# Patient Record
Sex: FEMALE | Race: WHITE | NOT HISPANIC OR LATINO | Employment: UNEMPLOYED | URBAN - METROPOLITAN AREA
[De-identification: names, ages, dates, MRNs, and addresses within clinical notes are randomized per-mention and may not be internally consistent; named-entity substitution may affect disease eponyms.]

---

## 2018-06-13 ENCOUNTER — CONVERSION ENCOUNTER (OUTPATIENT)
Dept: FAMILY MEDICINE CLINIC | Facility: CLINIC | Age: 28
End: 2018-06-13

## 2018-06-13 ENCOUNTER — OFFICE VISIT CONVERTED (OUTPATIENT)
Dept: FAMILY MEDICINE CLINIC | Facility: CLINIC | Age: 28
End: 2018-06-13
Attending: NURSE PRACTITIONER

## 2018-09-27 ENCOUNTER — CONVERSION ENCOUNTER (OUTPATIENT)
Dept: FAMILY MEDICINE CLINIC | Facility: CLINIC | Age: 28
End: 2018-09-27

## 2018-09-27 ENCOUNTER — OFFICE VISIT CONVERTED (OUTPATIENT)
Dept: FAMILY MEDICINE CLINIC | Facility: CLINIC | Age: 28
End: 2018-09-27
Attending: NURSE PRACTITIONER

## 2018-11-28 ENCOUNTER — OFFICE VISIT CONVERTED (OUTPATIENT)
Dept: FAMILY MEDICINE CLINIC | Facility: CLINIC | Age: 28
End: 2018-11-28
Attending: NURSE PRACTITIONER

## 2019-01-15 ENCOUNTER — HOSPITAL ENCOUNTER (OUTPATIENT)
Dept: LAB | Facility: HOSPITAL | Age: 29
Discharge: HOME | End: 2019-01-15
Attending: NURSE PRACTITIONER

## 2019-01-15 LAB
BASOPHILS # BLD AUTO: 0.05 10*3/UL (ref 0–0.2)
BASOPHILS NFR BLD AUTO: 1.15 % (ref 0–3)
EOSINOPHIL # BLD AUTO: 0.08 10*3/UL (ref 0–0.7)
EOSINOPHIL # BLD AUTO: 1.95 % (ref 0–7)
ERYTHROCYTE [DISTWIDTH] IN BLOOD BY AUTOMATED COUNT: 11.3 % (ref 11.5–14.5)
HBA1C MFR BLD: 13.1 G/DL (ref 12–16)
HCT VFR BLD AUTO: 37.4 % (ref 37–47)
LYMPHOCYTES # BLD AUTO: 1.44 10*3/UL (ref 1–5)
MCH RBC QN AUTO: 31.2 PG (ref 27–31)
MCHC RBC AUTO-ENTMCNC: 34.9 G/DL (ref 33–37)
MCV RBC AUTO: 89.5 FL (ref 81–99)
MONOCYTES # BLD AUTO: 0.39 10*3/UL (ref 0.2–1.2)
MONOCYTES NFR BLD AUTO: 9.11 % (ref 3–10)
NEUTROPHILS # BLD AUTO: 2.32 10*3/UL (ref 2–8)
NEUTROPHILS NFR BLD AUTO: 54.2 % (ref 30–85)
NRBC BLD AUTO-RTO: 0 % (ref 0–0.01)
PLATELET # BLD AUTO: 279 10*3/UL (ref 130–400)
PMV BLD AUTO: 6.6 FL (ref 7.4–10.4)
RBC # BLD AUTO: 4.18 10*6/UL (ref 4.2–5.4)
VARIANT LYMPHS NFR BLD MANUAL: 33.6 % (ref 20–45)
WBC # BLD AUTO: 4.28 10*3/UL (ref 4.8–10.8)

## 2019-01-17 ENCOUNTER — OFFICE VISIT CONVERTED (OUTPATIENT)
Dept: FAMILY MEDICINE CLINIC | Facility: CLINIC | Age: 29
End: 2019-01-17
Attending: NURSE PRACTITIONER

## 2019-01-17 ENCOUNTER — CONVERSION ENCOUNTER (OUTPATIENT)
Dept: FAMILY MEDICINE CLINIC | Facility: CLINIC | Age: 29
End: 2019-01-17

## 2019-01-23 ENCOUNTER — HOSPITAL ENCOUNTER (OUTPATIENT)
Dept: CARDIOLOGY | Facility: HOSPITAL | Age: 29
Discharge: HOME | End: 2019-01-23
Attending: NURSE PRACTITIONER

## 2019-03-19 ENCOUNTER — OFFICE VISIT CONVERTED (OUTPATIENT)
Dept: FAMILY MEDICINE CLINIC | Facility: CLINIC | Age: 29
End: 2019-03-19
Attending: NURSE PRACTITIONER

## 2019-09-26 ENCOUNTER — OFFICE VISIT CONVERTED (OUTPATIENT)
Dept: FAMILY MEDICINE CLINIC | Facility: CLINIC | Age: 29
End: 2019-09-26
Attending: NURSE PRACTITIONER

## 2019-09-26 ENCOUNTER — CONVERSION ENCOUNTER (OUTPATIENT)
Dept: FAMILY MEDICINE CLINIC | Facility: CLINIC | Age: 29
End: 2019-09-26

## 2020-05-06 ENCOUNTER — TELEMEDICINE CONVERTED (OUTPATIENT)
Dept: FAMILY MEDICINE CLINIC | Facility: CLINIC | Age: 30
End: 2020-05-06
Attending: NURSE PRACTITIONER

## 2020-11-09 ENCOUNTER — TELEMEDICINE CONVERTED (OUTPATIENT)
Dept: FAMILY MEDICINE CLINIC | Facility: CLINIC | Age: 30
End: 2020-11-09
Attending: NURSE PRACTITIONER

## 2021-05-10 ENCOUNTER — HOSPITAL ENCOUNTER (OUTPATIENT)
Dept: GENERAL RADIOLOGY | Facility: HOSPITAL | Age: 31
Discharge: HOME | End: 2021-05-10
Attending: NURSE PRACTITIONER

## 2021-05-10 ENCOUNTER — OFFICE VISIT CONVERTED (OUTPATIENT)
Dept: FAMILY MEDICINE CLINIC | Facility: CLINIC | Age: 31
End: 2021-05-10
Attending: NURSE PRACTITIONER

## 2021-05-13 NOTE — PROGRESS NOTES
Progress Note      Patient Name: Miriam Renteria   Patient ID: [id number]   Sex: Female   YOB: 1990    Primary Care Provider: Janett SHELDON   Referring Provider: Janett SHELDON    Visit Date: May 6, 2020    Provider: PITO Fernandes   Location: [facility]   Location Address: [address] [place] [address], [address]  [place]  [id number]   Location Phone: [phone]          Chief Complaint  · follow up depression medication      History Of Present Illness  Video Conferencing Visit  Miriam Renteria is a 29 year old /White female who is presenting for evaluation via video conferencing. Verbal consent obtained before beginning visit.   The following staff were present during this visit: Janett SHELDON and Sintia De Souza MA   Miriam Renteria is a 29 year old /White female who presents for evaluation and treatment of:      Pt consented to telehealth via ZOOM. She is following up on her depression medications, she is doing well and needs a refill on the Prozac.  Pt is doing well, no issues or problems at this time.    She had a baby last year, [facility] in [place].  She did not have a PAP she will schedule with our office.           Past Medical History  Disease Name Date Onset Notes   Allergic rhinitis --  --    Anxiety --  --    Hypothyroidism --  Post-partum    Iron deficiency anemia --  --    Pregnancy --  --    Urinary Incontinence --  --          Past Surgical History  Procedure Name Date Notes   Dilation and Curettage 2008 & 2013 --          Medication List  Name Date Started Instructions   Prozac 10 mg oral capsule 05/06/2020 take 2 capsules (20 mg) by oral route once daily for 90 days   Zyrtec 10 mg oral tablet 06/13/2018 take 1 tablet (10 mg) by oral route once daily         Allergy List  Allergen Name Date Reaction Notes   amoxicillin --  --  --          Family Medical History  Disease Name Relative/Age Notes   Cervical Neoplasm, Malignant Mother/28    --    Brain Neoplasm, Benign Uncle/   --    Heart Disease Grandmother (maternal)/   --    Hypertension Mother/   --    Lung cancer Uncle/   --    Skin Carcinoma Grandfather (maternal)/  Grandmother (maternal)/   --          Reproductive History  Menstrual   Age Menarche: 10   Pregnancy Summary   Total Pregnancies: 5 Full Term: 3 Premature: 0   Ab Induced: 0 Ab Spontaneous: 2 Ectopics: 0   Multiples: 0 Living: 3         Social History  Finding Status Start/Stop Quantity Notes   Alcohol Never --/-- --  --     --  --/-- --  --    Tobacco Never --/-- --  --          Immunizations  NameDate Admin Mfg Trade Name Lot Number Route Inj VIS Given VIS Publication   Hepatitis A01/17/2019 MSD VAQTA-ADULT [id number] IM RD 01/17/2019 07/20/2016   Comments: Injection given. Pt tolerated well.   Hepatitis A06/13/2018 SKB HAVRIX-ADULT  IM LD 06/13/2018 07/20/2016   Comments: Injection given. pt tolerated well.   Svswdvexz24/01/2018 SKB Fluzone Quadrivalent  NE NE 11/28/2018    Comments:          Review of Systems  · Constitutional  o Denies  o : fever, fatigue, weight loss, weight gain  · Cardiovascular  o Denies  o : lower extremity edema, claudication, chest pressure, palpitations  · Respiratory  o Denies  o : shortness of breath, wheezing, cough, hemoptysis, dyspnea on exertion  · Gastrointestinal  o Denies  o : nausea, vomiting, diarrhea, constipation, abdominal pain  · Psychiatric  o Admits  o : anxiety, depression      Physical Examination  · Constitutional  o Appearance  o : well-nourished, well developed, alert, in no acute distress  · Psychiatric  o Mood and Affect  o : mood normal, affect appropriate, denies any SI/HI              Assessment  · Depression     311/F32.9  · Hypothyroidism     244.9/E03.9  · Iron deficiency anemia     280.9/D50.9  · Anxiety     300.02/F41.1  · Allergic rhinitis     477.9/J30.9    Problems Reconciled  Plan  · Orders  o CBC with Auto Diff [facility] (72086) - - 05/06/2020  o CMP [facility] (74056) -  "- 05/06/2020  o Lipid Panel [facility] (34946) - - 05/06/2020  o Thyroid Profile (37981, 69198, THYII) - - 05/06/2020  o ACO-39: Current medications updated and reviewed () - - 05/06/2020  o Iron Profile (Iron 58442 TIBC 67046 and Transferrin 41840) (IRONP) - - 05/06/2020  · Medications  o Prozac 10 mg oral capsule   SIG: take 2 capsules (20 mg) by oral route once daily for 90 days   DISP: (180) capsules with 1 refills  Refilled on 05/06/2020     · Instructions  o Patient was given an SSRI/SSNRI medication and warned of possible side effects of the medication including potential for increased risk of suicidal thoughts and feelings. Patient was instructed that if they begin to exhibit any of these effects they will discontinue the medication immediately and contact our office or the ER ASAP.  o Patient agrees to a \"No Self Harm\" contract. Patient will either call us, 1, ER, [facility], [facility].  o Patient instructed to seek medical attention urgently for new or worsening symptoms.  o Call the office with any concerns or questions.  o she will schedule a pap with our office  · Disposition  o Return Visit Request in/on 6 months +/- 2 weeks (11074).            Electronically Signed by: PITO Fernandes -Author on May 6, 2020 08:26:05 AM  "

## 2021-05-13 NOTE — PROGRESS NOTES
Progress Note      Patient Name: Miriam Renteria   Patient ID: [id number]   Sex: Female   YOB: 1990    Primary Care Provider: Janett SHELDON   Referring Provider: Janett SHELDON    Visit Date: November 9, 2020    Provider: PITO Fernandes   [place] [facility]   Location Address: [address] [place] [address], [address]  [place], [place]  [id number]   Location Phone: [phone]          Chief Complaint  · 6 mo f/u      History Of Present Illness  Video Conferencing Visit  Miriam Renteria is a 30 year old /White female who is presenting for evaluation via video conferencing via google duo. Verbal consent obtained before beginning visit.   The following staff were present during this visit: PITO Fernandes and DIRK Bill   Miriam Renteria is a 30 year old /White female who presents for evaluation and treatment of:      Pt is a 6 mo f/u for depression. Pt states things are good. No issues to report at this time.     She would like a refill on Prozac however since category C pt to get an OK from OBGYN first.    Pt is pregnant and is seen by Dr. Daniel at [facility].       Past Medical History  Disease Name Date Onset Notes   Allergic rhinitis --  --    Anxiety --  --    Hypothyroidism --  Post-partum    Iron deficiency anemia --  --    Pregnancy --  --    Urinary incontinence --  --          Past Surgical History  Procedure Name Date Notes   Dilation and Curettage 2008 & 2013 --          Medication List  Name Date Started Instructions   Prozac 10 mg oral capsule 11/09/2020 take 2 capsules (20 mg) by oral route once daily for 90 days   Zyrtec 10 mg oral tablet 06/13/2018 take 1 tablet (10 mg) by oral route once daily         Allergy List  Allergen Name Date Reaction Notes   amoxicillin --  --  --          Family Medical History  Disease Name Relative/Age Notes   Cervical Neoplasm, Malignant Mother/28   --    Brain Neoplasm, Benign Uncle/   --     Heart Disease Grandmother (maternal)/   --    Hypertension Mother/   --    Lung cancer Uncle/   --    Skin Carcinoma Grandfather (maternal)/  Grandmother (maternal)/   --          Reproductive History  Menstrual   Age Menarche: 10   Pregnancy Summary   Total Pregnancies: 5 Full Term: 3 Premature: 0   Ab Induced: 0 Ab Spontaneous: 2 Ectopics: 0   Multiples: 0 Living: 3         Social History  Finding Status Start/Stop Quantity Notes   Alcohol Never --/-- --  --     --  --/-- --  --    Tobacco Never --/-- --  --          Immunizations  NameDate Admin Mfg Trade Name Lot Number Route Inj VIS Given VIS Publication   Hepatitis A01/17/2019 MSD VAQTA-ADULT [id number] IM RD 01/17/2019 07/20/2016   Comments: Injection given. Pt tolerated well.   Hepatitis A06/13/2018 SKB HAVRIX-ADULT  IM LD 06/13/2018 07/20/2016   Comments: Injection given. pt tolerated well.   Yiypenhsc25/01/2018 SKB Fluzone Quadrivalent  NE NE 11/28/2018    Comments:          Review of Systems  · Constitutional  o Denies  o : fever, fatigue, weight loss, weight gain  · Cardiovascular  o Denies  o : lower extremity edema, claudication, chest pressure, palpitations  · Respiratory  o Denies  o : shortness of breath, wheezing, cough, hemoptysis, dyspnea on exertion  · Gastrointestinal  o Denies  o : nausea, vomiting, diarrhea, constipation, abdominal pain  · Psychiatric  o Admits  o : anxiety, depression      Physical Examination  · Constitutional  o Appearance  o : well-nourished, well developed, alert, in no acute distress  · Neurologic  o Mental Status Examination  o :   § Orientation  § : grossly oriented to person, place and time  · Psychiatric  o Mood and Affect  o : mood normal, affect appropriate, denies any SI/HI          Assessment  · Allergic rhinitis due to allergen     477.9/J30.9  · Anemia     285.9/D64.9  · Anxiety disorder     300.00/F41.9  · Depression     311/F32.9  · Pregnant     V22.2/Z34.90      Plan  · Orders  o ACO-39: Current  "medications updated and reviewed (, 1159F) - - 11/09/2020  · Medications  o Prozac 10 mg oral capsule   SIG: take 2 capsules (20 mg) by oral route once daily for 90 days   DISP: (180) Capsule with 1 refills  Refilled on 11/09/2020     o Medications have been Reconciled  o Transition of Care or Provider Policy  · Instructions  o Patient agrees to a \"No Self Harm\" contract. Patient will either call , 911, ER, [facility], [facility].  o Patient agrees to a \"No Self Harm\" contract. Patient will either call , [facility], ER, [facility], [facility].  o Patient was educated/instructed on their diagnosis, treatment and medications prior to discharge from the clinic today.  o Patient instructed to seek medical attention urgently for new or worsening symptoms.  o Call the office with any concerns or questions.  o once we receive a statement stating pt ok to continue Prozac with her Pregnancy we will fill Prozac, on hold till then  · Disposition  o Return Visit Request in/on 6 months +/- 2 weeks ([id number]).            Electronically Signed by: PITO Fernandes -Author on November 9, 2020 08:42:52 AM  "

## 2021-05-15 VITALS
HEIGHT: 64 IN | SYSTOLIC BLOOD PRESSURE: 100 MMHG | HEART RATE: 80 BPM | DIASTOLIC BLOOD PRESSURE: 62 MMHG | BODY MASS INDEX: 29.28 KG/M2 | WEIGHT: 171.5 LBS | OXYGEN SATURATION: 99 %

## 2021-05-15 VITALS
HEART RATE: 92 BPM | BODY MASS INDEX: 23.39 KG/M2 | SYSTOLIC BLOOD PRESSURE: 96 MMHG | HEIGHT: 64 IN | WEIGHT: 137 LBS | OXYGEN SATURATION: 100 % | DIASTOLIC BLOOD PRESSURE: 62 MMHG

## 2021-05-16 VITALS
HEART RATE: 93 BPM | WEIGHT: 130.37 LBS | SYSTOLIC BLOOD PRESSURE: 92 MMHG | DIASTOLIC BLOOD PRESSURE: 60 MMHG | BODY MASS INDEX: 22.26 KG/M2 | OXYGEN SATURATION: 97 % | HEIGHT: 64 IN

## 2021-05-16 VITALS
HEART RATE: 93 BPM | SYSTOLIC BLOOD PRESSURE: 92 MMHG | HEIGHT: 64 IN | DIASTOLIC BLOOD PRESSURE: 62 MMHG | OXYGEN SATURATION: 100 % | BODY MASS INDEX: 22.53 KG/M2 | WEIGHT: 132 LBS

## 2021-05-16 VITALS
SYSTOLIC BLOOD PRESSURE: 91 MMHG | HEART RATE: 86 BPM | WEIGHT: 130.5 LBS | BODY MASS INDEX: 22.28 KG/M2 | HEIGHT: 64 IN | DIASTOLIC BLOOD PRESSURE: 59 MMHG

## 2021-05-16 VITALS
HEIGHT: 64 IN | WEIGHT: 133.25 LBS | BODY MASS INDEX: 22.75 KG/M2 | DIASTOLIC BLOOD PRESSURE: 61 MMHG | SYSTOLIC BLOOD PRESSURE: 91 MMHG | HEART RATE: 79 BPM

## 2021-06-05 NOTE — PROGRESS NOTES
"   Progress Note      Patient Name: Miriam Renteria   Patient ID: [id number]   Sex: Female   YOB: 1990    Primary Care Provider: Janett SHELDON   Referring Provider: Janett SHELDON    Visit Date: May 10, 2021    Provider: PITO Fernandes   Location: [facility]   Location Address: [address] [place] [address], [address]  [place], [place]  [id number]   Location Phone: [phone]          Chief Complaint  · Right wrist pain  · 6 mo f/u      History Of Present Illness  Miriam Renteria is a 30 year old /White female who presents for evaluation and treatment of:      Pt is a 6 mo f/u. Pt has c/o right wrist pain. Pt states she can't lift anything heavy without extra support, a \"wiping\" motion will cause pain. Pt states the pain is consistant. Pt has taken Tylenol and Ibuprofen with no relief.     Pt had pap at [facility]. Will get records.     Pt is due labs.     She needs a refill on her Prozac.       Past Medical History  Disease Name Date Onset Notes   Allergic rhinitis due to allergen --  --    Anemia --  --    Anxiety disorder --  --    Hypothyroidism --  Post-partum    Pregnancy --  --    Urinary Incontinence --  --          Past Surgical History  Procedure Name Date Notes   Dilation and Curettage 2008 & 2013 --          Medication List  Name Date Started Instructions   Prozac 10 mg oral capsule 11/09/2020 take 2 capsules (20 mg) by oral route once daily for 90 days   Zyrtec 10 mg oral tablet 06/13/2018 take 1 tablet (10 mg) by oral route once daily         Allergy List  Allergen Name Date Reaction Notes   amoxicillin --  --  --          Family Medical History  Disease Name Relative/Age Notes   Cervical Neoplasm, Malignant Mother/28   --    Brain Neoplasm, Benign Uncle/   --    Heart Disease Grandmother (maternal)/   --    Hypertension Mother/   --    Lung cancer Uncle/   --    Skin Carcinoma Grandfather (maternal)/  Grandmother (maternal)/   --  " "        Reproductive History  Menstrual   Age Menarche: 10   Pregnancy Summary   Total Pregnancies: 5 Full Term: 3 Premature: 0   Ab Induced: 0 Ab Spontaneous: 2 Ectopics: 0   Multiples: 0 Living: 3         Social History  Finding Status Start/Stop Quantity Notes   Alcohol Never --/-- --  --     --  --/-- --  --    Tobacco Never --/-- --  --          Immunizations  NameDate Admin Mfg Trade Name Lot Number Route Inj VIS Given VIS Publication   Hepatitis A01/17/2019 MSD VAQTA-ADULT [id number] IM RD 01/17/2019 07/20/2016   Comments: Injection given. Pt tolerated well.   Hepatitis A06/13/2018 SKB HAVRIX-ADULT  IM LD 06/13/2018 07/20/2016   Comments: Injection given. pt tolerated well.   Iawmxylgx94/01/2020 NE Not Entered  NE NE     Comments:          Review of Systems  · Constitutional  o Denies  o : fever, fatigue, weight loss, weight gain  · Cardiovascular  o Denies  o : lower extremity edema, claudication, chest pressure, palpitations  · Respiratory  o Denies  o : shortness of breath, wheezing, cough, hemoptysis, dyspnea on exertion  · Gastrointestinal  o Denies  o : nausea, vomiting, diarrhea, constipation, abdominal pain  · Musculoskeletal  o Admits  o : wrist pain on the right  · Psychiatric  o Admits  o : anxiety, depression      Vitals  Date Time BP Position Site L\R Cuff Size HR RR TEMP (F) WT  HT  BMI kg/m2 BSA m2 O2 Sat FR L/min FiO2 HC       03/19/2019 01:27 PM 96/62 Sitting    92 - R   137lbs 0oz 5'  4\" 23.52 1.68 100 %      09/26/2019 09:12 /62 Sitting    80 - R   171lbs 8oz 5'  4\" 29.44 1.87 99 %      05/10/2021 08:29 /60 Sitting    68 - R   158lbs 6oz 5'  4\" 27.18 1.8 99 %  21%          Physical Examination  · Constitutional  o Appearance  o : well-nourished, well developed, alert, in no acute distress  · Respiratory  o Respiratory Effort  o : breathing unlabored  o Auscultation of Lungs  o : normal breath sounds throughout  · Cardiovascular  o Heart  o :   § Auscultation of " Heart  § : regular rate and rhythm, no murmurs, gallops or rubs  § Palpation of Heart  § : normal apical impulse, no cardiac thrill present  o Peripheral Vascular System  o :   § Extremities  § : no cyanosis, clubbing or edema; less than 2 second refill noted  · Musculoskeletal  o Right Upper Extremity  o :   § Inspection/Palpation  § : no tenderness to palpation  § Range of Motion  § : range of motion normal, no joint crepitus or pain with motion present  o Left Upper Extremity  o :   § Inspection/Palpation  § : no tenderness to palpation  § Range of Motion  § : range of motion normal, no joint crepitus present, no pain with joint motion  o Right Lower Extremity  o :   § Inspection/Palpation  § : no joint or limb tenderness to palpation  § Range of Motion  § : range of motion normal, no joint crepitations present, no pain on motion  o Left Lower Extremity  o :   § Inspection/Palpation  § : no joint or limb tenderness to palpation  § Range of Motion  § : range of motion normal, no joint crepitations present, no pain on motion  · Skin and Subcutaneous Tissue  o General Inspection  o : no rashes or lesions present, no areas of discoloration  · Neurologic  o Mental Status Examination  o :   § Orientation  § : grossly oriented to person, place and time  o Cranial Nerves  o : cranial nerves intact and symmetric throughout  · Psychiatric  o Mood and Affect  o : mood normal, affect appropriate, denies any SI/HI          Assessment  · Allergic rhinitis due to allergen     477.9/J30.9  · Anemia     285.9/D64.9  · Anxiety disorder     300.00/F41.9  · Depression     311/F32.9  · Hypothyroidism     244.9/E03.9  · Screening for depression     V79.0/Z13.89  · Screening for lipid disorders     V77.91/Z13.220  · Right wrist pain     719.43/M25.531      Plan  · Orders  o CBC with Auto Diff [facility] (82963) - 285.9/D64.9 - 05/10/2021  o Iron panel (iron, TIBC, transferrin saturation) (61388, 07370, 11870) - 285.9/D64.9 -  "05/10/2021  o Thyroid Profile (13201, 97055, THYII) - 244.9/E03.9 - 05/10/2021  o ACO-18: Negative screen for clinical depression using a standardized tool () - V79.0/Z13.89 - 05/10/2021  o Lipid Panel [facility] (37218) - V77.91/Z13.220 - 05/10/2021  o CMP [facility] (38719) - 285.9/D64.9, V77.91/Z13.220 - 05/10/2021  o ACO-14: Influenza immunization administered or previously received [facility] () - - 05/10/2021  o ACO-39: Current medications updated and reviewed (1159F, ) - - 05/10/2021  o Wrist xray right 3v [facility] (07509-GY) - - 05/10/2021  o OB/GYN CONSULTATION (OBGYN) - - 05/10/2021   [facility]  · Medications  o Prozac 20 mg oral capsule   SIG: take 1 capsule (20 mg) by oral route once daily in the evening for 90 days   DISP: (90) Capsule with 1 refills  Adjusted on 05/10/2021     o Medications have been Reconciled  o Transition of Care or Provider Policy  · Instructions  o Patient was given an SSRI/SSNRI medication and warned of possible side effects of the medication including potential for increased risk of suicidal thoughts and feelings. Patient was instructed that if they begin to exhibit any of these effects they will discontinue the medication immediately and contact our office or the ER [facility].  o Patient agrees to a \"No Self Harm\" contract. Patient will either call us, 911, , [facility], [facility].  o Patient was given an SSRI/SSNRI medication and warned of possible side effects of the medication including potential for increased risk of suicidal thoughts and feelings. Patient was instructed that if they begin to exhibit any of these effects they will discontinue the medication immediately and contact our office or the  AS[facility].  o Patient agrees to a \"No Self Harm\" contract. Patient will either call us, 91, , [facility], [facility].  o Depression Screen completed and scanned into the EMR under the designated " folder within the patient's documents.  o Today's PHQ-9 result is _0__  o The provider screening met the required time of 15 minutes.  o Patient was educated/instructed on their diagnosis, treatment and medications prior to discharge from the clinic today.  o Patient instructed to seek medical attention urgently for new or worsening symptoms.  o Call the office with any concerns or questions.  · Disposition  o Call or Return if symptoms worsen or persist.  o Return Visit Request in/on 6 months +/- 2 weeks (85997).            Electronically Signed by: PITO Fernandes -Author on May 10, 2021 08:48:34 AM

## 2021-07-11 ENCOUNTER — HOSPITAL ENCOUNTER (EMERGENCY)
Facility: HOSPITAL | Age: 31
Discharge: HOME | End: 2021-07-11
Attending: EMERGENCY MEDICINE | Admitting: EMERGENCY MEDICINE

## 2021-07-11 VITALS
TEMPERATURE: 98.7 F | SYSTOLIC BLOOD PRESSURE: 94 MMHG | HEART RATE: 87 BPM | RESPIRATION RATE: 16 BRPM | OXYGEN SATURATION: 97 % | WEIGHT: 157.63 LBS | HEIGHT: 64 IN | BODY MASS INDEX: 26.91 KG/M2 | DIASTOLIC BLOOD PRESSURE: 48 MMHG

## 2021-07-11 DIAGNOSIS — F10.920 ALCOHOLIC INTOXICATION WITHOUT COMPLICATION (HCC): Primary | ICD-10-CM

## 2021-07-11 DIAGNOSIS — F41.0 PANIC ATTACK: ICD-10-CM

## 2021-07-11 LAB
ALBUMIN SERPL-MCNC: 4.8 G/DL (ref 3.5–5.2)
ALBUMIN/GLOB SERPL: 2 G/DL
ALP SERPL-CCNC: 62 U/L (ref 39–117)
ALT SERPL W P-5'-P-CCNC: 11 U/L (ref 1–33)
ANION GAP SERPL CALCULATED.3IONS-SCNC: 18.2 MMOL/L (ref 5–15)
AST SERPL-CCNC: 16 U/L (ref 1–32)
BASOPHILS # BLD AUTO: 0.06 10*3/MM3 (ref 0–0.2)
BASOPHILS NFR BLD AUTO: 0.7 % (ref 0–1.5)
BILIRUB SERPL-MCNC: 0.2 MG/DL (ref 0–1.2)
BUN SERPL-MCNC: 9 MG/DL (ref 6–20)
BUN/CREAT SERPL: 10.7 (ref 7–25)
CALCIUM SPEC-SCNC: 9 MG/DL (ref 8.6–10.5)
CHLORIDE SERPL-SCNC: 106 MMOL/L (ref 98–107)
CO2 SERPL-SCNC: 21.8 MMOL/L (ref 22–29)
CREAT SERPL-MCNC: 0.84 MG/DL (ref 0.57–1)
D-LACTATE SERPL-SCNC: 2.3 MMOL/L (ref 0.5–2)
D-LACTATE SERPL-SCNC: 8.4 MMOL/L (ref 0.5–2)
DEPRECATED RDW RBC AUTO: 42.1 FL (ref 37–54)
EOSINOPHIL # BLD AUTO: 0.19 10*3/MM3 (ref 0–0.4)
EOSINOPHIL NFR BLD AUTO: 2.3 % (ref 0.3–6.2)
ERYTHROCYTE [DISTWIDTH] IN BLOOD BY AUTOMATED COUNT: 12.9 % (ref 12.3–15.4)
ETHANOL BLD-MCNC: 151 MG/DL (ref 0–10)
ETHANOL UR QL: 0.15 %
GFR SERPL CREATININE-BSD FRML MDRD: 79 ML/MIN/1.73
GLOBULIN UR ELPH-MCNC: 2.4 GM/DL
GLUCOSE BLDC GLUCOMTR-MCNC: 96 MG/DL (ref 70–130)
GLUCOSE SERPL-MCNC: 107 MG/DL (ref 65–99)
HCG INTACT+B SERPL-ACNC: <0.5 MIU/ML
HCT VFR BLD AUTO: 41.4 % (ref 34–46.6)
HGB BLD-MCNC: 13.5 G/DL (ref 12–15.9)
IMM GRANULOCYTES # BLD AUTO: 0.01 10*3/MM3 (ref 0–0.05)
IMM GRANULOCYTES NFR BLD AUTO: 0.1 % (ref 0–0.5)
LYMPHOCYTES # BLD AUTO: 4.38 10*3/MM3 (ref 0.7–3.1)
LYMPHOCYTES NFR BLD AUTO: 52.5 % (ref 19.6–45.3)
MCH RBC QN AUTO: 29.7 PG (ref 26.6–33)
MCHC RBC AUTO-ENTMCNC: 32.6 G/DL (ref 31.5–35.7)
MCV RBC AUTO: 91 FL (ref 79–97)
MONOCYTES # BLD AUTO: 0.91 10*3/MM3 (ref 0.1–0.9)
MONOCYTES NFR BLD AUTO: 10.9 % (ref 5–12)
NEUTROPHILS NFR BLD AUTO: 2.79 10*3/MM3 (ref 1.7–7)
NEUTROPHILS NFR BLD AUTO: 33.5 % (ref 42.7–76)
NRBC BLD AUTO-RTO: 0 /100 WBC (ref 0–0.2)
PLATELET # BLD AUTO: 332 10*3/MM3 (ref 140–450)
PMV BLD AUTO: 9.2 FL (ref 6–12)
POTASSIUM SERPL-SCNC: 3.9 MMOL/L (ref 3.5–5.2)
PROT SERPL-MCNC: 7.2 G/DL (ref 6–8.5)
RBC # BLD AUTO: 4.55 10*6/MM3 (ref 3.77–5.28)
SODIUM SERPL-SCNC: 146 MMOL/L (ref 136–145)
WBC # BLD AUTO: 8.34 10*3/MM3 (ref 3.4–10.8)

## 2021-07-11 PROCEDURE — 80053 COMPREHEN METABOLIC PANEL: CPT | Performed by: EMERGENCY MEDICINE

## 2021-07-11 PROCEDURE — 25010000002 LORAZEPAM PER 2 MG: Performed by: EMERGENCY MEDICINE

## 2021-07-11 PROCEDURE — 96374 THER/PROPH/DIAG INJ IV PUSH: CPT

## 2021-07-11 PROCEDURE — 82077 ASSAY SPEC XCP UR&BREATH IA: CPT | Performed by: EMERGENCY MEDICINE

## 2021-07-11 PROCEDURE — 25010000002 ONDANSETRON PER 1 MG: Performed by: EMERGENCY MEDICINE

## 2021-07-11 PROCEDURE — 96375 TX/PRO/DX INJ NEW DRUG ADDON: CPT

## 2021-07-11 PROCEDURE — 82962 GLUCOSE BLOOD TEST: CPT

## 2021-07-11 PROCEDURE — 84702 CHORIONIC GONADOTROPIN TEST: CPT | Performed by: EMERGENCY MEDICINE

## 2021-07-11 PROCEDURE — 99283 EMERGENCY DEPT VISIT LOW MDM: CPT

## 2021-07-11 PROCEDURE — 83605 ASSAY OF LACTIC ACID: CPT | Performed by: EMERGENCY MEDICINE

## 2021-07-11 PROCEDURE — 85025 COMPLETE CBC W/AUTO DIFF WBC: CPT | Performed by: EMERGENCY MEDICINE

## 2021-07-11 RX ORDER — ONDANSETRON 2 MG/ML
4 INJECTION INTRAMUSCULAR; INTRAVENOUS ONCE
Status: COMPLETED | OUTPATIENT
Start: 2021-07-11 | End: 2021-07-11

## 2021-07-11 RX ORDER — LORAZEPAM 2 MG/ML
1 INJECTION INTRAMUSCULAR ONCE
Status: COMPLETED | OUTPATIENT
Start: 2021-07-11 | End: 2021-07-11

## 2021-07-11 RX ORDER — LORAZEPAM 2 MG/ML
INJECTION INTRAMUSCULAR
Status: DISPENSED
Start: 2021-07-11 | End: 2021-07-11

## 2021-07-11 RX ORDER — IBUPROFEN 800 MG/1
800 TABLET ORAL EVERY 8 HOURS
COMMUNITY
Start: 2021-01-24 | End: 2021-11-02

## 2021-07-11 RX ORDER — ONDANSETRON 2 MG/ML
INJECTION INTRAMUSCULAR; INTRAVENOUS
Status: DISPENSED
Start: 2021-07-11 | End: 2021-07-11

## 2021-07-11 RX ORDER — FLUOXETINE HYDROCHLORIDE 20 MG/1
CAPSULE ORAL
COMMUNITY
Start: 2021-04-19 | End: 2021-08-16 | Stop reason: SDUPTHER

## 2021-07-11 RX ADMIN — ONDANSETRON 4 MG: 2 INJECTION INTRAMUSCULAR; INTRAVENOUS at 02:03

## 2021-07-11 RX ADMIN — LORAZEPAM 1 MG: 2 INJECTION INTRAMUSCULAR; INTRAVENOUS at 02:03

## 2021-07-11 RX ADMIN — SODIUM CHLORIDE 1000 ML: 9 INJECTION, SOLUTION INTRAVENOUS at 03:34
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
## 2021-07-15 VITALS
BODY MASS INDEX: 27.04 KG/M2 | HEIGHT: 64 IN | SYSTOLIC BLOOD PRESSURE: 101 MMHG | OXYGEN SATURATION: 99 % | HEART RATE: 68 BPM | WEIGHT: 158.37 LBS | DIASTOLIC BLOOD PRESSURE: 60 MMHG

## 2021-08-12 PROBLEM — F41.9 ANXIETY DISORDER: Status: ACTIVE | Noted: 2021-08-12

## 2021-08-12 PROBLEM — O42.90 PROM (PREMATURE RUPTURE OF MEMBRANES): Status: ACTIVE | Noted: 2021-01-22

## 2021-08-12 PROBLEM — R00.2 PALPITATIONS: Status: ACTIVE | Noted: 2020-09-10

## 2021-08-12 PROBLEM — D64.9 ANEMIA: Status: ACTIVE | Noted: 2021-08-12

## 2021-08-12 PROBLEM — M54.16 RADICULOPATHY, LUMBAR REGION: Status: ACTIVE | Noted: 2020-09-10

## 2021-08-12 PROBLEM — F41.0 PANIC DISORDER WITHOUT AGORAPHOBIA: Status: ACTIVE | Noted: 2020-09-10

## 2021-08-12 PROBLEM — O60.00 PRETERM LABOR: Status: ACTIVE | Noted: 2020-11-30

## 2021-08-12 PROBLEM — F32.A DEPRESSION: Status: ACTIVE | Noted: 2019-10-14

## 2021-08-12 PROBLEM — E03.9 HYPOTHYROIDISM: Status: ACTIVE | Noted: 2021-08-12

## 2021-08-12 PROBLEM — Z34.90 PREGNANCY: Status: ACTIVE | Noted: 2021-08-12

## 2021-08-12 PROBLEM — R32 URINARY INCONTINENCE: Status: ACTIVE | Noted: 2021-08-12

## 2021-08-12 PROBLEM — F43.12 CHRONIC POST-TRAUMATIC STRESS DISORDER: Status: ACTIVE | Noted: 2019-04-05

## 2021-08-12 PROBLEM — J30.9 ALLERGIC RHINITIS DUE TO ALLERGEN: Status: ACTIVE | Noted: 2021-08-12

## 2021-08-16 ENCOUNTER — OFFICE VISIT (OUTPATIENT)
Dept: FAMILY MEDICINE CLINIC | Facility: CLINIC | Age: 31
End: 2021-08-16

## 2021-08-16 VITALS
SYSTOLIC BLOOD PRESSURE: 96 MMHG | OXYGEN SATURATION: 99 % | WEIGHT: 158 LBS | HEIGHT: 64 IN | BODY MASS INDEX: 26.98 KG/M2 | DIASTOLIC BLOOD PRESSURE: 56 MMHG | HEART RATE: 69 BPM

## 2021-08-16 DIAGNOSIS — M54.50 LUMBAR BACK PAIN: ICD-10-CM

## 2021-08-16 DIAGNOSIS — F41.9 ANXIETY DISORDER, UNSPECIFIED TYPE: Primary | ICD-10-CM

## 2021-08-16 PROCEDURE — 99213 OFFICE O/P EST LOW 20 MIN: CPT | Performed by: NURSE PRACTITIONER

## 2021-08-16 RX ORDER — FLUOXETINE HYDROCHLORIDE 20 MG/1
20 CAPSULE ORAL DAILY
Qty: 90 CAPSULE | Refills: 1 | Status: SHIPPED | OUTPATIENT
Start: 2021-08-16
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
## 2021-08-20 ENCOUNTER — TELEPHONE (OUTPATIENT)
Dept: FAMILY MEDICINE CLINIC | Facility: CLINIC | Age: 31
End: 2021-08-20

## 2021-08-20 NOTE — TELEPHONE ENCOUNTER
Caller: Miriam Renteria    Relationship to patient: Self    Best call back number: [phone]     Patient is needing: PATIENT CALLED STATING SHE NEEDS TO SEE AN EYE DOCTOR AND HER INSURANCE  REQUIRES THEM TO HAVE A REFERRAL BY THEIR PCP AND SHE IS WANTING TO KNOW IF HER PCP CAN SEND IN A REFERRAL TO:    [facility] [address] [place] [address]  [phone]    PLEASE ADVISE THANK YOU.

## 2021-11-03 ENCOUNTER — OFFICE VISIT (OUTPATIENT)
Dept: FAMILY MEDICINE CLINIC | Facility: CLINIC | Age: 31
End: 2021-11-03

## 2021-11-03 VITALS
HEART RATE: 85 BPM | OXYGEN SATURATION: 98 % | SYSTOLIC BLOOD PRESSURE: 96 MMHG | WEIGHT: 160 LBS | DIASTOLIC BLOOD PRESSURE: 55 MMHG | BODY MASS INDEX: 27.31 KG/M2 | HEIGHT: 64 IN

## 2021-11-03 DIAGNOSIS — F32.A DEPRESSION, UNSPECIFIED DEPRESSION TYPE: Primary | ICD-10-CM

## 2021-11-03 PROCEDURE — 99213 OFFICE O/P EST LOW 20 MIN: CPT | Performed by: NURSE PRACTITIONER

## 2021-11-03 RX ORDER — DOCUSATE SODIUM 100 MG/1
100 CAPSULE, LIQUID FILLED ORAL 2 TIMES DAILY
COMMUNITY

## 2021-11-03 NOTE — PROGRESS NOTES
"Chief Complaint  Depression    Subjective            Miriam Renteria presents to [facility]  Pt is here to discuss depression. Pt is currently taking fluoxetine 20 mg. Pt would like to discuss increasing this. Pt is 11 weeks pregnant. PT has spoke with OB and is okay with her taking this medication. Pt states she is feeling a bit more \"ansty\" at times. She will get a statement from her OB to continue Prozac.    Pt is seeing Millicent [facility] with [facility]. Pt will need a  referral for this.         PMH  Past Medical History:   Diagnosis Date   • Allergic rhinitis due to allergen    • Anemia    • Anxiety disorder    • Hypothyroidism     post partum   • Pregnancy    • Urinary incontinence        ALLERGY  No Known Allergies     SURGICALHX  Past Surgical History:   Procedure Laterality Date   • DILATATION AND CURETTAGE      2008 2013        SOCX  Social History     Tobacco Use   • Smoking status: Former Smoker   • Smokeless tobacco: Never Used   Substance Use Topics   • Alcohol use: Not Currently       FAMHX  Family History   Problem Relation Age of Onset   • Cervical cancer Mother 28   • Hypertension Mother    • Heart disease Maternal Grandmother    • Skin cancer Maternal Grandmother    • Skin cancer Maternal Grandfather    • Brain cancer Other    • Lung cancer Other         MEDSIGONLY  Current Outpatient Medications on File Prior to Visit   Medication Sig   • amoxicillin (AMOXIL) 875 MG tablet Take 1 tablet by mouth 2 (Two) Times a Day.   • docusate sodium (COLACE) 100 MG capsule Take 100 mg by mouth 2 (Two) Times a Day.   • Ferrous Gluconate (IRON 27 PO) Take  by mouth.   • FLUoxetine (PROzac) 20 MG capsule Take 1 capsule by mouth Daily.   • Methylcobalamin (B12-ACTIVE PO) Take  by mouth.   • Prenatal MV-Min-Fe Fum-FA-DHA (PRENATAL 1 PO) Take  by mouth.   • pyridoxine (VITAMIN B-6) 100 MG tablet Take 50 mg by mouth.     No current facility-administered medications on file " "prior to visit.       Health Maintenance Due   Topic Date Due   • ANNUAL PHYSICAL  Never done   • INFLUENZA VACCINE  08/01/2021   • PAP SMEAR  Never done   • COVID-19 Vaccine (2 - Moderna 2-dose series) 10/07/2021       Objective     BP 96/55   Pulse 85   Ht 162.6 cm (64\")   Wt 72.6 kg (160 lb)   SpO2 98%   BMI 27.46 kg/m²       Physical Exam  Constitutional:       General: She is not in acute distress.     Appearance: Normal appearance. She is not ill-appearing.   HENT:      Head: Normocephalic and atraumatic.      Mouth/Throat:      Pharynx: No oropharyngeal exudate or posterior oropharyngeal erythema.   Cardiovascular:      Rate and Rhythm: Normal rate and regular rhythm.      Heart sounds: Normal heart sounds. No murmur heard.      Pulmonary:      Effort: Pulmonary effort is normal. No respiratory distress.      Breath sounds: Normal breath sounds.   Chest:      Chest wall: No tenderness.   Abdominal:      General: There is no distension.      Palpations: There is no mass.      Tenderness: There is no abdominal tenderness. There is no guarding.   Musculoskeletal:         General: No swelling or tenderness. Normal range of motion.      Cervical back: Normal range of motion and neck supple.   Skin:     General: Skin is warm and dry.      Findings: No rash.   Neurological:      General: No focal deficit present.      Mental Status: She is alert and oriented to person, place, and time. Mental status is at baseline.      Gait: Gait normal.   Psychiatric:         Mood and Affect: Mood normal.         Behavior: Behavior normal.         Thought Content: Thought content normal.         Judgment: Judgment normal.           Result Review :                           Assessment and Plan        Diagnoses and all orders for this visit:    1. Depression, unspecified depression type (Primary)  Comments:   she has consulted with her OBGYN and she is ok to continue, will increase dose to 40mg  once recieve a statment from her " OB              Follow Up     Return in about 6 months (around 5/3/2022).    Patient was given instructions and counseling regarding her condition or for health maintenance advice. Please see specific information pulled into the AVS if appropriate.     Miriam Yin Myra  reports that she has quit smoking. She has never used smokeless tobacco..

## 2021-12-23 ENCOUNTER — OFFICE VISIT (OUTPATIENT)
Dept: FAMILY MEDICINE CLINIC | Facility: CLINIC | Age: 31
End: 2021-12-23

## 2021-12-23 VITALS
SYSTOLIC BLOOD PRESSURE: 98 MMHG | HEIGHT: 64 IN | HEART RATE: 80 BPM | BODY MASS INDEX: 28 KG/M2 | DIASTOLIC BLOOD PRESSURE: 48 MMHG | OXYGEN SATURATION: 100 % | WEIGHT: 164 LBS

## 2021-12-23 DIAGNOSIS — L29.9 PRURITUS: ICD-10-CM

## 2021-12-23 DIAGNOSIS — R23.8 VESICULAR RASH: Primary | ICD-10-CM

## 2021-12-23 DIAGNOSIS — Z3A.18 18 WEEKS GESTATION OF PREGNANCY: ICD-10-CM

## 2021-12-23 PROCEDURE — 87255 GENET VIRUS ISOLATE HSV: CPT | Performed by: NURSE PRACTITIONER

## 2021-12-23 PROCEDURE — 99214 OFFICE O/P EST MOD 30 MIN: CPT | Performed by: NURSE PRACTITIONER

## 2021-12-23 RX ORDER — TRIAMCINOLONE ACETONIDE 1 MG/G
1 CREAM TOPICAL 2 TIMES DAILY
Qty: 15 G | Refills: 0 | Status: SHIPPED | OUTPATIENT
Start: 2021-12-23

## 2021-12-23 NOTE — PROGRESS NOTES
Chief Complaint  Rash (Patient went to urgent care and they said it was shingles, she does not think that is what is it. It is speading on her stomach and back, not painful. Itching. )    Subjective          Miriam Renteria presents to [facility] for   History of Present Illness   Patient presents today to follow-up on a rash. Patient went to urgent care and they said it was shingles, she does not think that is what is it. It is speading on her stomach and back, states it is not really painful but is itching.  States that it initially started and had a burning sensation.  Patient states that her  also has a very similar rash at present from poison ivy and she thinks that she got poison ivy from him.  Urgent care placed her on acyclovir which she is being compliant with.    Of note, patient is 18 weeks pregnant currently.    Medical History  Past Medical History:   Diagnosis Date   • Allergic rhinitis due to allergen    • Anemia    • Anxiety disorder    • Hypothyroidism     post partum   • Pregnancy    • Urinary incontinence      Surgical History  Past Surgical History:   Procedure Laterality Date   • DILATATION AND CURETTAGE      2008 2013     Social History  Social History     Socioeconomic History   • Marital status:    Tobacco Use   • Smoking status: Former Smoker   • Smokeless tobacco: Never Used   Substance and Sexual Activity   • Alcohol use: Not Currently   • Drug use: Never   • Sexual activity: Defer       Current Outpatient Medications:   •  acyclovir (ZOVIRAX) 400 MG tablet, Take 2 tablets by mouth 5 (Five) Times a Day for 5 days. Take no more than 5 doses a day., Disp: 50 tablet, Rfl: 0  •  docusate sodium (COLACE) 100 MG capsule, Take 100 mg by mouth 2 (Two) Times a Day., Disp: , Rfl:   •  Ferrous Gluconate (IRON 27 PO), Take  by mouth., Disp: , Rfl:   •  FLUoxetine (PROzac) 20 MG capsule, Take 1 capsule by mouth Daily., Disp: 90 capsule, Rfl: 1  •   "Prenatal MV-Min-Fe Fum-FA-DHA (PRENATAL 1 PO), Take  by mouth., Disp: , Rfl:   •  triamcinolone (KENALOG) 0.1 % cream, Apply 1 application topically to the appropriate area as directed 2 (Two) Times a Day., Disp: 15 g, Rfl: 0    Review of Systems     Objective     BP 98/48   Pulse 80   Ht 162.6 cm (64\")   Wt 74.4 kg (164 lb)   SpO2 100%   BMI 28.15 kg/m²     Body mass index is 28.15 kg/m².    Physical Exam  Vitals reviewed.   Constitutional:       Appearance: Normal appearance. She is well-developed.   HENT:      Head: Normocephalic and atraumatic.      Right Ear: External ear normal.      Left Ear: External ear normal.   Eyes:      Conjunctiva/sclera: Conjunctivae normal.      Pupils: Pupils are equal, round, and reactive to light.   Cardiovascular:      Rate and Rhythm: Normal rate and regular rhythm.      Heart sounds: No murmur heard.  No friction rub. No gallop.    Pulmonary:      Effort: Pulmonary effort is normal.      Breath sounds: Normal breath sounds. No wheezing or rhonchi.   Skin:     General: Skin is warm and dry.      Comments: Erythematous scattered maculopapular rash noted mainly on right side of body, with area of vesicular lesions already drying up on right side abdomen/ribs, however there are a few lesions on her left side back.   Neurological:      Mental Status: She is alert and oriented to person, place, and time.      Cranial Nerves: No cranial nerve deficit.   Psychiatric:         Mood and Affect: Mood and affect normal.         Behavior: Behavior normal.         Thought Content: Thought content normal.         Judgment: Judgment normal.         Result Review :     The following data was reviewed by: PITO Mcneill on 12/23/2021:        Data reviewed: Recent urgent care visit where she was diagnosed with shingles                 Assessment:  Diagnoses and all orders for this visit:    1. Vesicular rash (Primary)  -     triamcinolone (KENALOG) 0.1 % cream; Apply 1 application " topically to the appropriate area as directed 2 (Two) Times a Day.  Dispense: 15 g; Refill: 0  -     Herpes Simplex Virus Culture - Swab, Chest Wall; Future    2. Pruritus  -     triamcinolone (KENALOG) 0.1 % cream; Apply 1 application topically to the appropriate area as directed 2 (Two) Times a Day.  Dispense: 15 g; Refill: 0    3. 18 weeks gestation of pregnancy        Plan: Discussed with patient that typical treatment for possible poison ivy would be oral steroids, however because she is pregnant this is contraindicated.  Discussed that we can do some topical steroid cream and that I recommend she only use it as needed in the areas that are itching, and that we will attempt to obtain a viral culture of the area of vesicular lesions, though I discussed with her that if it is negative that does not necessarily mean that this was not shingles as her lesions have already dried up and may not provide a good sample.  Discussed with patient that I highly recommend she finish the prescription of acyclovir as this medication is safe in pregnancy and leaving shingles untreated has potential long-term implications.  Patient verbalized understanding          Follow Up     Return if symptoms worsen or fail to improve.    Patient was given instructions and counseling regarding her condition or for health maintenance advice. Please see specific information pulled into the AVS if appropriate.     Chantal Alan, APRN  12/23/2021

## 2021-12-28 LAB — HSV SPEC CULT: NEGATIVE

## 2021-12-29 ENCOUNTER — TELEPHONE (OUTPATIENT)
Dept: FAMILY MEDICINE CLINIC | Facility: CLINIC | Age: 31
End: 2021-12-29

## 2021-12-29 NOTE — TELEPHONE ENCOUNTER
----- Message from PITO Tobin sent at 12/28/2021  2:28 PM EST -----  HSV culture was negative, however as discussed this is not necessarily rule out that it was shingles as the lesions were not really draining for a very good sample.